# Patient Record
Sex: FEMALE | Race: WHITE | NOT HISPANIC OR LATINO | URBAN - METROPOLITAN AREA
[De-identification: names, ages, dates, MRNs, and addresses within clinical notes are randomized per-mention and may not be internally consistent; named-entity substitution may affect disease eponyms.]

---

## 2017-06-09 ENCOUNTER — EMERGENCY (EMERGENCY)
Facility: HOSPITAL | Age: 77
LOS: 1 days | Discharge: HOME | End: 2017-06-09
Attending: EMERGENCY MEDICINE | Admitting: EMERGENCY MEDICINE
Payer: MEDICARE

## 2017-06-09 VITALS
TEMPERATURE: 98 F | SYSTOLIC BLOOD PRESSURE: 175 MMHG | DIASTOLIC BLOOD PRESSURE: 104 MMHG | HEART RATE: 72 BPM | OXYGEN SATURATION: 98 % | RESPIRATION RATE: 16 BRPM

## 2017-06-09 DIAGNOSIS — G45.4 TRANSIENT GLOBAL AMNESIA: ICD-10-CM

## 2017-06-09 DIAGNOSIS — N17.9 ACUTE KIDNEY FAILURE, UNSPECIFIED: ICD-10-CM

## 2017-06-09 DIAGNOSIS — T78.40XA ALLERGY, UNSPECIFIED, INITIAL ENCOUNTER: ICD-10-CM

## 2017-06-09 DIAGNOSIS — Z79.899 OTHER LONG TERM (CURRENT) DRUG THERAPY: ICD-10-CM

## 2017-06-09 DIAGNOSIS — N18.3 CHRONIC KIDNEY DISEASE, STAGE 3 (MODERATE): ICD-10-CM

## 2017-06-09 DIAGNOSIS — Z96.659 PRESENCE OF UNSPECIFIED ARTIFICIAL KNEE JOINT: Chronic | ICD-10-CM

## 2017-06-09 DIAGNOSIS — Z90.710 ACQUIRED ABSENCE OF BOTH CERVIX AND UTERUS: Chronic | ICD-10-CM

## 2017-06-09 DIAGNOSIS — E03.9 HYPOTHYROIDISM, UNSPECIFIED: ICD-10-CM

## 2017-06-09 DIAGNOSIS — R41.82 ALTERED MENTAL STATUS, UNSPECIFIED: ICD-10-CM

## 2017-06-09 DIAGNOSIS — C50.911 MALIGNANT NEOPLASM OF UNSPECIFIED SITE OF RIGHT FEMALE BREAST: ICD-10-CM

## 2017-06-09 DIAGNOSIS — Z98.89 OTHER SPECIFIED POSTPROCEDURAL STATES: Chronic | ICD-10-CM

## 2017-06-09 LAB
ALBUMIN SERPL ELPH-MCNC: 4.5 G/DL — SIGNIFICANT CHANGE UP (ref 3.3–5)
ALP SERPL-CCNC: 55 U/L — SIGNIFICANT CHANGE UP (ref 40–120)
ALT FLD-CCNC: 22 U/L RC — SIGNIFICANT CHANGE UP (ref 10–45)
ANION GAP SERPL CALC-SCNC: 10 MMOL/L — SIGNIFICANT CHANGE UP (ref 5–17)
ANION GAP SERPL CALC-SCNC: 12 MMOL/L — SIGNIFICANT CHANGE UP (ref 5–17)
APPEARANCE UR: CLEAR — SIGNIFICANT CHANGE UP
AST SERPL-CCNC: 28 U/L — SIGNIFICANT CHANGE UP (ref 10–40)
BASOPHILS # BLD AUTO: 0 K/UL — SIGNIFICANT CHANGE UP (ref 0–0.2)
BASOPHILS NFR BLD AUTO: 0.5 % — SIGNIFICANT CHANGE UP (ref 0–2)
BILIRUB SERPL-MCNC: 0.6 MG/DL — SIGNIFICANT CHANGE UP (ref 0.2–1.2)
BILIRUB UR-MCNC: NEGATIVE — SIGNIFICANT CHANGE UP
BUN SERPL-MCNC: 31 MG/DL — HIGH (ref 7–23)
BUN SERPL-MCNC: 34 MG/DL — HIGH (ref 7–23)
CALCIUM SERPL-MCNC: 10.1 MG/DL — SIGNIFICANT CHANGE UP (ref 8.4–10.5)
CALCIUM SERPL-MCNC: 9.9 MG/DL — SIGNIFICANT CHANGE UP (ref 8.4–10.5)
CHLORIDE SERPL-SCNC: 102 MMOL/L — SIGNIFICANT CHANGE UP (ref 96–108)
CHLORIDE SERPL-SCNC: 98 MMOL/L — SIGNIFICANT CHANGE UP (ref 96–108)
CO2 SERPL-SCNC: 25 MMOL/L — SIGNIFICANT CHANGE UP (ref 22–31)
CO2 SERPL-SCNC: 29 MMOL/L — SIGNIFICANT CHANGE UP (ref 22–31)
COLOR SPEC: YELLOW — SIGNIFICANT CHANGE UP
CREAT SERPL-MCNC: 1.49 MG/DL — HIGH (ref 0.5–1.3)
CREAT SERPL-MCNC: 1.63 MG/DL — HIGH (ref 0.5–1.3)
DIFF PNL FLD: ABNORMAL
EOSINOPHIL # BLD AUTO: 0.2 K/UL — SIGNIFICANT CHANGE UP (ref 0–0.5)
EOSINOPHIL NFR BLD AUTO: 3.7 % — SIGNIFICANT CHANGE UP (ref 0–6)
EPI CELLS # UR: SIGNIFICANT CHANGE UP /HPF
GAS PNL BLDV: SIGNIFICANT CHANGE UP
GLUCOSE SERPL-MCNC: 105 MG/DL — HIGH (ref 70–99)
GLUCOSE SERPL-MCNC: 95 MG/DL — SIGNIFICANT CHANGE UP (ref 70–99)
GLUCOSE UR QL: NEGATIVE — SIGNIFICANT CHANGE UP
HCT VFR BLD CALC: 44.8 % — SIGNIFICANT CHANGE UP (ref 34.5–45)
HGB BLD-MCNC: 15.1 G/DL — SIGNIFICANT CHANGE UP (ref 11.5–15.5)
KETONES UR-MCNC: NEGATIVE — SIGNIFICANT CHANGE UP
LEUKOCYTE ESTERASE UR-ACNC: NEGATIVE — SIGNIFICANT CHANGE UP
LYMPHOCYTES # BLD AUTO: 1.2 K/UL — SIGNIFICANT CHANGE UP (ref 1–3.3)
LYMPHOCYTES # BLD AUTO: 19.6 % — SIGNIFICANT CHANGE UP (ref 13–44)
MAGNESIUM SERPL-MCNC: 2.2 MG/DL — SIGNIFICANT CHANGE UP (ref 1.6–2.6)
MCHC RBC-ENTMCNC: 32.8 PG — SIGNIFICANT CHANGE UP (ref 27–34)
MCHC RBC-ENTMCNC: 33.6 GM/DL — SIGNIFICANT CHANGE UP (ref 32–36)
MCV RBC AUTO: 97.5 FL — SIGNIFICANT CHANGE UP (ref 80–100)
MONOCYTES # BLD AUTO: 0.5 K/UL — SIGNIFICANT CHANGE UP (ref 0–0.9)
MONOCYTES NFR BLD AUTO: 7.6 % — SIGNIFICANT CHANGE UP (ref 2–14)
NEUTROPHILS # BLD AUTO: 4.2 K/UL — SIGNIFICANT CHANGE UP (ref 1.8–7.4)
NEUTROPHILS NFR BLD AUTO: 68.6 % — SIGNIFICANT CHANGE UP (ref 43–77)
NITRITE UR-MCNC: NEGATIVE — SIGNIFICANT CHANGE UP
PH UR: 5.5 — SIGNIFICANT CHANGE UP (ref 5–8)
PHOSPHATE SERPL-MCNC: 4 MG/DL — SIGNIFICANT CHANGE UP (ref 2.5–4.5)
PLATELET # BLD AUTO: 167 K/UL — SIGNIFICANT CHANGE UP (ref 150–400)
POTASSIUM SERPL-MCNC: 4.7 MMOL/L — SIGNIFICANT CHANGE UP (ref 3.5–5.3)
POTASSIUM SERPL-MCNC: 5.2 MMOL/L — SIGNIFICANT CHANGE UP (ref 3.5–5.3)
POTASSIUM SERPL-SCNC: 4.7 MMOL/L — SIGNIFICANT CHANGE UP (ref 3.5–5.3)
POTASSIUM SERPL-SCNC: 5.2 MMOL/L — SIGNIFICANT CHANGE UP (ref 3.5–5.3)
PROT SERPL-MCNC: 7.4 G/DL — SIGNIFICANT CHANGE UP (ref 6–8.3)
PROT UR-MCNC: NEGATIVE — SIGNIFICANT CHANGE UP
RBC # BLD: 4.59 M/UL — SIGNIFICANT CHANGE UP (ref 3.8–5.2)
RBC # FLD: 12.1 % — SIGNIFICANT CHANGE UP (ref 10.3–14.5)
RBC CASTS # UR COMP ASSIST: SIGNIFICANT CHANGE UP /HPF (ref 0–2)
SODIUM SERPL-SCNC: 135 MMOL/L — SIGNIFICANT CHANGE UP (ref 135–145)
SODIUM SERPL-SCNC: 141 MMOL/L — SIGNIFICANT CHANGE UP (ref 135–145)
SP GR SPEC: 1.02 — SIGNIFICANT CHANGE UP (ref 1.01–1.02)
TROPONIN T SERPL-MCNC: <0.01 NG/ML — SIGNIFICANT CHANGE UP (ref 0–0.06)
TSH SERPL-MCNC: 2.31 UIU/ML — SIGNIFICANT CHANGE UP (ref 0.27–4.2)
UROBILINOGEN FLD QL: NEGATIVE — SIGNIFICANT CHANGE UP
WBC # BLD: 6 K/UL — SIGNIFICANT CHANGE UP (ref 3.8–10.5)
WBC # FLD AUTO: 6 K/UL — SIGNIFICANT CHANGE UP (ref 3.8–10.5)
WBC UR QL: SIGNIFICANT CHANGE UP /HPF (ref 0–5)

## 2017-06-09 RX ORDER — ANASTROZOLE 1 MG/1
1 TABLET ORAL DAILY
Qty: 0 | Refills: 0 | Status: DISCONTINUED | OUTPATIENT
Start: 2017-06-09 | End: 2017-06-10

## 2017-06-09 RX ORDER — LEVOTHYROXINE SODIUM 125 MCG
75 TABLET ORAL DAILY
Qty: 0 | Refills: 0 | Status: DISCONTINUED | OUTPATIENT
Start: 2017-06-09 | End: 2017-06-10

## 2017-06-09 RX ORDER — SODIUM CHLORIDE 9 MG/ML
2000 INJECTION INTRAMUSCULAR; INTRAVENOUS; SUBCUTANEOUS ONCE
Qty: 0 | Refills: 0 | Status: COMPLETED | OUTPATIENT
Start: 2017-06-09 | End: 2017-06-09

## 2017-06-09 RX ORDER — SODIUM CHLORIDE 9 MG/ML
1000 INJECTION INTRAMUSCULAR; INTRAVENOUS; SUBCUTANEOUS
Qty: 0 | Refills: 0 | Status: DISCONTINUED | OUTPATIENT
Start: 2017-06-09 | End: 2017-06-10

## 2017-06-09 RX ADMIN — SODIUM CHLORIDE 1000 MILLILITER(S): 9 INJECTION INTRAMUSCULAR; INTRAVENOUS; SUBCUTANEOUS at 14:50

## 2017-06-09 RX ADMIN — Medication 1 DROP(S): at 21:24

## 2017-06-09 NOTE — CONSULT NOTE ADULT - ATTENDING COMMENTS
Diagnosis is probably transient global amnesia. Doubt small PCA infarct and highly doubt seizure.  Suggest. Outpatient neurology followup and MRI brain/MRA neck and head. Agree with aspirin for now if no contraindication, until stroke is ruled out. No role for further neurologic investigation.

## 2017-06-09 NOTE — H&P ADULT - NSHPPHYSICALEXAM_GEN_ALL_CORE
A pleasant elderly female in NAD, lying flat in bed, well groomed  HEENT: OG EOMI B/L slight erythema around eyes L > R  Neck: Supple, no JVD, no thyromegaly  Lungs: clear, no rhonchi, no wheeze, no crackles  CVS: S1 S2 no M/R/G  Abdomen: obese, no tenderness, no organomegaly, BS present  Neuro: AO x 3, no focal weakness, non focal  Psych: appropriate affect  Skin: warm, dry  Ext: no edema, no clubbing  Msk: no joint swelling or deformities  Back: no CVA tenderness, no kyphosis/scoliosis

## 2017-06-09 NOTE — H&P ADULT - PROBLEM SELECTOR PLAN 1
Resolved  Seen by neurology  Recommend MRI but t is adamantly opposed to having a closed MRI  Wants to have it as outpatient

## 2017-06-09 NOTE — H&P ADULT - NSHPREVIEWOFSYSTEMS_GEN_ALL_CORE
Gen: no loss of wt or appetite  ENT: no dizziness or hearing loss  Ophth: no blurring of vision or loss of vision  Resp: No cough or sputum production  CVS: No CP or palpitations or orthopnea  GI: no N/V/D  : no dysuria, hematuria  Endo: no polyuria or excessive sweating  Neuro: no weakness or paresthesias see above HPI as well  Psych: No suicidal or depressive ideation  Heme: No petechiae or easy bruising  Msk: No joint pain or swelling  All other ROS negative

## 2017-06-09 NOTE — H&P ADULT - FAMILY HISTORY
Mother  Still living? Unknown  Family history of diabetes mellitus type I, Age at diagnosis: Age Unknown     Sibling  Still living? Unknown  Family history of bipolar disorder, Age at diagnosis: Age Unknown

## 2017-06-09 NOTE — ED PROVIDER NOTE - MEDICAL DECISION MAKING DETAILS
[age] yo female presents with confusion; last seen normal before bed last night; + short term memory loss; no headache, no focal weakness, no numbness; no blurry vision; on exam oriented to person, place, not year; no focal neuro deficits; cranial nerves intact; no pronator drift; Plan: ct brain, labs, neuro

## 2017-06-09 NOTE — CONSULT NOTE ADULT - PROBLEM SELECTOR RECOMMENDATION 9
[]f/u with Neurology as outpt  []consider ASA until f/u if ok with PMD given GERD []f/u with Neurology as outpt  []consider ASA until f/u if ok with PMD given GERD  []if patient admitted for medical reasons can get MRI brain w/o con and routine EEG, however that should not hol up discharge

## 2017-06-09 NOTE — H&P ADULT - PROBLEM SELECTOR PLAN 2
Pt unaware of stage of kidney disease  educated at length,  at bedside who just spoke to pts PCP and reports that her creatinine baseline is 1.6  Reassured as well at length

## 2017-06-09 NOTE — H&P ADULT - HISTORY OF PRESENT ILLNESS
76yoF brought in by . Apparently, pt woke up confused this morning. She had +short term memory loss and was repeating phrases. Last night she was acting normal. Pt has no clear recollection but does report "feeling disoriented." Pt states she felt very anxious, no h/o illicit drug use, reports no headache, no visual changes, no neck pain, no cp/sob/palp/cough, no abd pain, dysuria. Currently she reports feeling back to her normal self and her  at her bedside confirms the same. No seizure activity noted. Reports allergic reaction to new make up around her eyes last two days

## 2017-06-09 NOTE — CONSULT NOTE ADULT - SUBJECTIVE AND OBJECTIVE BOX
Neurology consult    GUERO RAMOS76yFemale     Patient is a 76y old  Female who presents with a chief complaint of     HPI:  76 F  PMH hypothyroid, GERD, fibroids colloid cancer p/w transient episode of short term memory loss. Pt last recalls having sex this am. Post-coital  reported new onset memory issues Per  during episode patient repeating questions could not recall events from day prior. symptoms now resolved with patient not recalling event. Recent stressors including grandchild with colitis. denies abnormal movements, shaking, focal weakness, numbness, visual deficits     REVIEW OF SYSTEMS:    Constitutional: No fever, chills, fatigue, weakness  Eyes: no eye pain, visual disturbances, or discharge  ENT:  No difficulty hearing, tinnitus, vertigo; No sinus or throat pain  Neck: No pain or stiffness  Respiratory: No cough, dyspnea, wheezing   Cardiovascular: No chest pain, palpitations,   Gastrointestinal: No abdominal or epigastric pain. No nausea, vomiting  No diarrhea or constipation.   Genitourinary: No dysuria, frequency, hematuria or incontinence  Neurological: No headaches, lightheadedness, vertigo, numbness or tremors  Psychiatric: No depression, anxiety, mood swings or difficulty sleeping  Musculoskeletal: No joint pain or swelling; No muscle, back or extremity pain  Skin: No itching, burning, rashes or lesions   Lymph Nodes: No enlarged glands  Endocrine: No heat or cold intolerance; No hair loss, No h/o diabetes or thyroid dysfunction  Allergy and Immunologic: No hives or eczema    MEDICATIONS    synthroid, zantac, armidex    PMH: Hypothyroid, GERd, pre-DM, collodi cance       PSH: lumpectmy. hysterectomy      FAMILY HISTORY: Mother DM, siblings bipolar do. children cancer      SOCIAL HISTORY:  No history of tobacco or alcohol use     Allergies    No Known Allergies    Intolerances            Vital Signs Last 24 Hrs  T(C): 36.7, Max: 36.7 (-09 @ 09:12)  T(F): 98.1, Max: 98.1 (06- @ 09:12)  HR: 75 (72 - 75)  BP: 161/99 (161/99 - 175/104)  BP(mean): --  RR: 16 (16 - 16)  SpO2: 100% (98% - 100%)      On Neurological Examination:    Mental Status - Patient is alert, awake, oriented X3. fluent, names, no dysarthria no aphasia Follows commands well and able to answer questions appropriately. recall 3/3 Mood and affect  normal    Cranial Nerves - PERRL, EOMI, VFF, V1-V3 intact, no gross facial asymmetry, tongue/uvula midline    Motor Exam - 5/5 throughout, no drift     nml bulk/tone    Sensory    Intact to light touch and pinprick bilaterally    Coord: FTN intact bilaterally     Gait -  normal     Reflexes:       brisk 2+ throughout                                                  GENERAL Exam:     Nontoxic , No Acute Distress   	  HEENT:  normocephalic, atraumatic  		  LUNGS:	Clear bilaterally  No Wheeze  Decreased bilaterally  	  HEART:	 Normal S1S2   No murmur RRR        	  GI/ ABDOMEN:  Soft  Non tender    EXTREMITIES:   No Edema  No Clubbing  No Cyanosis No Edema    MUSCULOSKELETAL: Normal Range of Motion  	   SKIN:      Normal   No Ecchymosis               LABS:  CBC Full  -  ( 2017 09:41 )  WBC Count : 6.0 K/uL  Hemoglobin : 15.1 g/dL  Hematocrit : 44.8 %  Platelet Count - Automated : 167 K/uL  Mean Cell Volume : 97.5 fl  Mean Cell Hemoglobin : 32.8 pg  Mean Cell Hemoglobin Concentration : 33.6 gm/dL  Auto Neutrophil # : 4.2 K/uL  Auto Lymphocyte # : 1.2 K/uL  Auto Monocyte # : 0.5 K/uL  Auto Eosinophil # : 0.2 K/uL  Auto Basophil # : 0.0 K/uL  Auto Neutrophil % : 68.6 %  Auto Lymphocyte % : 19.6 %  Auto Monocyte % : 7.6 %  Auto Eosinophil % : 3.7 %  Auto Basophil % : 0.5 %    Urinalysis Basic - ( 2017 10:25 )    Color: Yellow / Appearance: Clear / S.020 / pH: x  Gluc: x / Ketone: Negative  / Bili: Negative / Urobili: Negative   Blood: x / Protein: Negative / Nitrite: Negative   Leuk Esterase: Negative / RBC: 0-2 /HPF / WBC 0-2 /HPF   Sq Epi: x / Non Sq Epi: Occasional /HPF / Bacteria: x          135  |  98  |  34<H>  ----------------------------<  95  5.2   |  25  |  1.49<H>    Ca    10.1      2017 09:41  Phos  4.0     -  Mg     2.2     -    TPro  7.4  /  Alb  4.5  /  TBili  0.6  /  DBili  x   /  AST  28  /  ALT  22  /  AlkPhos  55  06-09    LIVER FUNCTIONS - ( 2017 09:41 )  Alb: 4.5 g/dL / Pro: 7.4 g/dL / ALK PHOS: 55 U/L / ALT: 22 U/L RC / AST: 28 U/L / GGT: x               RADIOLOGY  CTH IMPRESSION: Age-appropriate involutional and ischemic gliotic changes. No   hemorrhage.

## 2017-06-09 NOTE — CONSULT NOTE ADULT - ASSESSMENT
76 F  PMH hypothyroid, GERD, fibroids colloid cancer p/w transient episode of memory loss post-coitally  since resolved. PE neurologically intact. CTH wnl. Most likely represents TGA since resolved. Further w/u as outpt with Neurology

## 2017-06-09 NOTE — ED ADULT NURSE NOTE - OBJECTIVE STATEMENT
76 y.o female c c/o AMS. Pts  noticed she didn't remember events from night before such as sleeping at [facility] for a benefit dinner. Pt is extremely repetitive in her statements. Keeps reiterating to her  at bedside "he should get another woman because he's such a wonderful man and shouldn't have to take care of a invalid"- this statement repeated 3x while examining pt. Pts pupils +4 R B/L. +strength in all 4 extremities. No weakness noted. No drift noted. Pt follows commands. Clear speech- disorientated thoughts. Alert to name/place/. Pt couldn't recall date this morning as per . No facial droop noted. redness below eyes noted B/L-  states she has had that before. FS 99. IV access obtained. Code stroke not called.

## 2017-06-09 NOTE — ED ADULT NURSE REASSESSMENT NOTE - NS ED NURSE REASSESS COMMENT FT1
Pts mental status back to baseline. Pt notified RN (Harjinder) that her IV in RAC needed to be moved to L arm d/t Hx of R sided lumpectomy. RN unaware of lumpectomy/ arm restriction upon initial encounter c pt during IV placement d/t pts AMS.  at bedside had forgotten hx of R arm restriction. IV d/c in RAC- clean/intact/ no redness noted. IV placed in LAC as per pts request. Pink band applied to R arm.

## 2017-06-10 VITALS
TEMPERATURE: 98 F | DIASTOLIC BLOOD PRESSURE: 68 MMHG | RESPIRATION RATE: 17 BRPM | HEART RATE: 58 BPM | SYSTOLIC BLOOD PRESSURE: 113 MMHG | OXYGEN SATURATION: 96 %

## 2017-06-10 LAB
ANION GAP SERPL CALC-SCNC: 14 MMOL/L — SIGNIFICANT CHANGE UP (ref 5–17)
BUN SERPL-MCNC: 27 MG/DL — HIGH (ref 7–23)
CALCIUM SERPL-MCNC: 9.6 MG/DL — SIGNIFICANT CHANGE UP (ref 8.4–10.5)
CHLORIDE SERPL-SCNC: 107 MMOL/L — SIGNIFICANT CHANGE UP (ref 96–108)
CO2 SERPL-SCNC: 21 MMOL/L — LOW (ref 22–31)
CREAT SERPL-MCNC: 1.36 MG/DL — HIGH (ref 0.5–1.3)
CULTURE RESULTS: NO GROWTH — SIGNIFICANT CHANGE UP
GLUCOSE SERPL-MCNC: 84 MG/DL — SIGNIFICANT CHANGE UP (ref 70–99)
HBA1C BLD-MCNC: 6 % — HIGH (ref 4–5.6)
HCT VFR BLD CALC: 39.7 % — SIGNIFICANT CHANGE UP (ref 34.5–45)
HGB BLD-MCNC: 13 G/DL — SIGNIFICANT CHANGE UP (ref 11.5–15.5)
MCHC RBC-ENTMCNC: 31.1 PG — SIGNIFICANT CHANGE UP (ref 27–34)
MCHC RBC-ENTMCNC: 32.7 GM/DL — SIGNIFICANT CHANGE UP (ref 32–36)
MCV RBC AUTO: 95 FL — SIGNIFICANT CHANGE UP (ref 80–100)
PLATELET # BLD AUTO: 145 K/UL — LOW (ref 150–400)
POTASSIUM SERPL-MCNC: 3.9 MMOL/L — SIGNIFICANT CHANGE UP (ref 3.5–5.3)
POTASSIUM SERPL-SCNC: 3.9 MMOL/L — SIGNIFICANT CHANGE UP (ref 3.5–5.3)
RBC # BLD: 4.18 M/UL — SIGNIFICANT CHANGE UP (ref 3.8–5.2)
RBC # FLD: 13.7 % — SIGNIFICANT CHANGE UP (ref 10.3–14.5)
SODIUM SERPL-SCNC: 142 MMOL/L — SIGNIFICANT CHANGE UP (ref 135–145)
SPECIMEN SOURCE: SIGNIFICANT CHANGE UP
WBC # BLD: 4.73 K/UL — SIGNIFICANT CHANGE UP (ref 3.8–10.5)
WBC # FLD AUTO: 4.73 K/UL — SIGNIFICANT CHANGE UP (ref 3.8–10.5)

## 2017-06-10 PROCEDURE — 83036 HEMOGLOBIN GLYCOSYLATED A1C: CPT

## 2017-06-10 PROCEDURE — 80048 BASIC METABOLIC PNL TOTAL CA: CPT

## 2017-06-10 PROCEDURE — 80053 COMPREHEN METABOLIC PANEL: CPT

## 2017-06-10 PROCEDURE — 99285 EMERGENCY DEPT VISIT HI MDM: CPT | Mod: 25

## 2017-06-10 PROCEDURE — 70450 CT HEAD/BRAIN W/O DYE: CPT

## 2017-06-10 PROCEDURE — 87086 URINE CULTURE/COLONY COUNT: CPT

## 2017-06-10 PROCEDURE — 85027 COMPLETE CBC AUTOMATED: CPT

## 2017-06-10 PROCEDURE — 82435 ASSAY OF BLOOD CHLORIDE: CPT

## 2017-06-10 PROCEDURE — 82962 GLUCOSE BLOOD TEST: CPT

## 2017-06-10 PROCEDURE — 83735 ASSAY OF MAGNESIUM: CPT

## 2017-06-10 PROCEDURE — 82553 CREATINE MB FRACTION: CPT

## 2017-06-10 PROCEDURE — 82947 ASSAY GLUCOSE BLOOD QUANT: CPT

## 2017-06-10 PROCEDURE — 93005 ELECTROCARDIOGRAM TRACING: CPT

## 2017-06-10 PROCEDURE — 71045 X-RAY EXAM CHEST 1 VIEW: CPT

## 2017-06-10 PROCEDURE — 84295 ASSAY OF SERUM SODIUM: CPT

## 2017-06-10 PROCEDURE — 82550 ASSAY OF CK (CPK): CPT

## 2017-06-10 PROCEDURE — 82330 ASSAY OF CALCIUM: CPT

## 2017-06-10 PROCEDURE — 81001 URINALYSIS AUTO W/SCOPE: CPT

## 2017-06-10 PROCEDURE — 84100 ASSAY OF PHOSPHORUS: CPT

## 2017-06-10 PROCEDURE — 84484 ASSAY OF TROPONIN QUANT: CPT

## 2017-06-10 PROCEDURE — 82803 BLOOD GASES ANY COMBINATION: CPT

## 2017-06-10 PROCEDURE — 84443 ASSAY THYROID STIM HORMONE: CPT

## 2017-06-10 PROCEDURE — 84132 ASSAY OF SERUM POTASSIUM: CPT

## 2017-06-10 PROCEDURE — 83605 ASSAY OF LACTIC ACID: CPT

## 2017-06-10 PROCEDURE — 85014 HEMATOCRIT: CPT

## 2017-06-10 RX ORDER — ACETAMINOPHEN 500 MG
650 TABLET ORAL ONCE
Qty: 0 | Refills: 0 | Status: COMPLETED | OUTPATIENT
Start: 2017-06-10 | End: 2017-06-10

## 2017-06-10 RX ORDER — FAMOTIDINE 10 MG/ML
20 INJECTION INTRAVENOUS ONCE
Qty: 0 | Refills: 0 | Status: COMPLETED | OUTPATIENT
Start: 2017-06-10 | End: 2017-06-10

## 2017-06-10 RX ADMIN — Medication 650 MILLIGRAM(S): at 05:35

## 2017-06-10 RX ADMIN — Medication 650 MILLIGRAM(S): at 05:05

## 2017-06-10 RX ADMIN — Medication 1 DROP(S): at 05:05

## 2017-06-10 RX ADMIN — Medication 1 DROP(S): at 13:02

## 2017-06-10 RX ADMIN — FAMOTIDINE 20 MILLIGRAM(S): 10 INJECTION INTRAVENOUS at 05:29

## 2017-06-10 RX ADMIN — Medication 75 MICROGRAM(S): at 05:05

## 2017-06-10 RX ADMIN — ANASTROZOLE 1 MILLIGRAM(S): 1 TABLET ORAL at 13:01

## 2017-06-10 NOTE — PROGRESS NOTE ADULT - SUBJECTIVE AND OBJECTIVE BOX
Patient is a 76y old  Female who presents with a chief complaint of Transient confusion (10 Familia 2017 12:50)      SUBJECTIVE / OVERNIGHT EVENTS: No nausea, vomiting or diarrhea, no fever or chills, no dizziness or chest pain, no dysuria or hematuria, no jt pain or swelling    MEDICATIONS  (STANDING):  sodium chloride 0.9%. 1000milliLiter(s) IV Continuous <Continuous>  anastrozole 1milliGRAM(s) Oral daily  artificial  tears Solution 1Drop(s) Both EYES three times a day  levothyroxine 75MICROGram(s) Oral daily    MEDICATIONS  (PRN):      Vital Signs Last 24 Hrs  T(C): 36.7, Max: 36.9 (-09 @ 19:35)  HR: 52 (52 - 60)  BP: 102/66 (102/66 - 123/82)  RR: 16 (14 - 17)  SpO2: 95% (95% - 97%)  Wt(kg): --  CAPILLARY BLOOD GLUCOSE    I&O's Summary    I & Os for current day (as of 10 Familia 2017 12:59)  =============================================  IN: 750 ml / OUT: 0 ml / NET: 750 ml      PHYSICAL EXAM:  GENERAL: NAD, well-developed  HEAD:  Atraumatic, Normocephalic  EYES: EOMI, PERRLA, conjunctiva and sclera clear  NECK: Supple, No JVD  CHEST/LUNG: Clear to auscultation bilaterally; No wheeze  HEART: Regular rate and rhythm; No murmurs, rubs, or gallops  ABDOMEN: Soft, Nontender, Nondistended; Bowel sounds present  EXTREMITIES:  2+ Peripheral Pulses, No clubbing, cyanosis, or edema  PSYCH: AAOx3  NEUROLOGY: non-focal  SKIN: No rashes or lesions    LABS:                        13.0   4.73  )-----------( 145      ( 10 Familia 2017 08:01 )             39.7     06-10    142  |  107  |  27<H>  ----------------------------<  84  3.9   |  21<L>  |  1.36<H>    Ca    9.6      10 Familia 2017 07:56  Phos  4.0     -  Mg     2.2     -    TPro  7.4  /  Alb  4.5  /  TBili  0.6  /  DBili  x   /  AST  28  /  ALT  22  /  AlkPhos  55  -      CARDIAC MARKERS ( 2017 16:20 )  x     / <0.01 ng/mL / x     / x     / x      CARDIAC MARKERS ( 2017 09:41 )  x     / <0.01 ng/mL / 87 U/L / x     / 1.8 ng/mL      Urinalysis Basic - ( 2017 10:25 )    Color: Yellow / Appearance: Clear / S.020 / pH: x  Gluc: x / Ketone: Negative  / Bili: Negative / Urobili: Negative   Blood: x / Protein: Negative / Nitrite: Negative   Leuk Esterase: Negative / RBC: 0-2 /HPF / WBC 0-2 /HPF   Sq Epi: x / Non Sq Epi: Occasional /HPF / Bacteria: x          Consultant(s) Notes Reviewed:      Care Discussed with Consultants/Other Providers: Dr Libman

## 2017-06-10 NOTE — DISCHARGE NOTE ADULT - PATIENT PORTAL LINK FT
“You can access the Follow[employer] Patient Portal, offered by [facility], by registering with the following website: [email or web address]”

## 2017-06-10 NOTE — PROGRESS NOTE ADULT - ASSESSMENT
Patient is a 76y old  Female who presents with a chief complaint of Transient confusion, now fully resolved

## 2017-06-10 NOTE — PROGRESS NOTE ADULT - PROBLEM SELECTOR PLAN 1
Seen and examined with neurology attending Dr Libman  normal exam  Cleared for dc  pt adamantly refusing inpt MRI brain  Wants outpatient open MRI  knows importance of having the MRI ASAP   at bedside aware as well

## 2017-06-10 NOTE — DISCHARGE NOTE ADULT - CARE PROVIDER_API CALL
Libman, Richard B (MD), Neurology; Vascular Neurology  [address] [place] [id number]  Phone: [phone]  Fax: [phone]

## 2017-06-10 NOTE — PROGRESS NOTE ADULT - ATTENDING COMMENTS
pt informed that this hospital visit will be registered as an Emergency Department visit as opposed to an actual admission

## 2017-06-10 NOTE — DISCHARGE NOTE ADULT - PLAN OF CARE
condition totally resolved follow up with neurology, need MRI brain in 1 - 2 weeks not allow kidney fxn to worsen regular diet continue synthroid regular follow up with oncology you do not make enough thyroid hormone  signs & symptoms of low levels of thyroid hormone - tired, getting cold easily, coarse or thin hair, constipation, shortness of breath, swelling, irregular periods  your doctor will do thyroid hormone blood tests at least once a year to monitor if medication dose is adequate  take your thyroid medicine as directed by your doctor & on empty stomach Avoid taking (NSAIDs) - (ex: Ibuprofen, Advil, Celebrex, Naprosyn)  Avoid taking any nephrotoxic agents (can harm kidneys) - Intravenous contrast for diagnostic testing, combination cold medications.  Have all medications adjusted for your renal function by your Health Care Provider.  Blood pressure control is important.  Take all medication as prescribed.

## 2017-06-10 NOTE — DISCHARGE NOTE ADULT - HOSPITAL COURSE
76yoF brought in by . Apparently, pt woke up confused this morning. She had +short term memory loss and was repeating phrases. Last night she was acting normal. Pt has no clear recollection but does report "feeling disoriented." Pt states she felt very anxious, no h/o illicit drug use, reports no headache, no visual changes, no neck pain, no cp/sob/palp/cough, no abd pain, dysuria. Currently she reports feeling back to her normal self and her  at her bedside confirms the same. No seizure activity noted. Reports allergic reaction to new make up around her eyes last two days    Seen and cleared by neurology for discharge  Recommend MRI brain asap after DC (pt refused MRI in patient states wants open MRI)  Understands importance of MRI eso in view of h/o breast Ca

## 2017-06-10 NOTE — DISCHARGE NOTE ADULT - MEDICATION SUMMARY - MEDICATIONS TO TAKE
I will START or STAY ON the medications listed below when I get home from the hospital:    anastrozole 1 mg oral tablet  -- 1 tab(s) by mouth once a day  -- Indication: For Breast cancer, right breast    Zantac 75 oral tablet  -- 1 tab(s) by mouth , As Needed  -- Indication: For GERD    TheraTears ophthalmic solution  -- 1 drop(s) in each eye 2 to 3 times a day  -- Indication: For dry eyes    Align 4 mg oral capsule  -- 1 cap(s) by mouth once a day  -- Indication: For Colon health    Synthroid 75 mcg (0.075 mg) oral tablet  -- 1 tab(s) by mouth 6 times a week (except Sunday)  -- Indication: For Hypothyroid

## 2017-06-10 NOTE — DISCHARGE NOTE ADULT - CARE PLAN
Principal Discharge DX:	Transient global amnesia  Goal:	condition totally resolved  Instructions for follow-up, activity and diet:	follow up with neurology, need MRI brain in 1 - 2 weeks  Secondary Diagnosis:	CKD (chronic kidney disease) stage 3, GFR 30-59 ml/min  Goal:	not allow kidney fxn to worsen  Instructions for follow-up, activity and diet:	regular diet  Secondary Diagnosis:	Hypothyroid  Goal:	continue synthroid  Secondary Diagnosis:	Breast cancer, right breast  Goal:	regular follow up with oncology Principal Discharge DX:	Transient global amnesia  Goal:	condition totally resolved  Instructions for follow-up, activity and diet:	follow up with neurology, need MRI brain in 1 - 2 weeks  Secondary Diagnosis:	CKD (chronic kidney disease) stage 3, GFR 30-59 ml/min  Goal:	not allow kidney fxn to worsen  Instructions for follow-up, activity and diet:	Avoid taking (NSAIDs) - (ex: Ibuprofen, Advil, Celebrex, Naprosyn)  Avoid taking any nephrotoxic agents (can harm kidneys) - Intravenous contrast for diagnostic testing, combination cold medications.  Have all medications adjusted for your renal function by your Health Care Provider.  Blood pressure control is important.  Take all medication as prescribed.  Secondary Diagnosis:	Hypothyroid  Goal:	continue synthroid  Instructions for follow-up, activity and diet:	you do not make enough thyroid hormone  signs & symptoms of low levels of thyroid hormone - tired, getting cold easily, coarse or thin hair, constipation, shortness of breath, swelling, irregular periods  your doctor will do thyroid hormone blood tests at least once a year to monitor if medication dose is adequate  take your thyroid medicine as directed by your doctor & on empty stomach  Secondary Diagnosis:	Breast cancer, right breast  Goal:	regular follow up with oncology

## 2017-11-10 RX ORDER — LEVOTHYROXINE SODIUM 125 MCG
0 TABLET ORAL
Qty: 0 | Refills: 0 | COMMUNITY

## 2017-11-10 RX ORDER — LEVOTHYROXINE SODIUM 125 MCG
1 TABLET ORAL
Qty: 0 | Refills: 0 | COMMUNITY

## 2017-11-10 RX ORDER — ANASTROZOLE 1 MG/1
0 TABLET ORAL
Qty: 0 | Refills: 0 | COMMUNITY

## 2017-11-10 RX ORDER — RANITIDINE HYDROCHLORIDE 150 MG/1
1 TABLET, FILM COATED ORAL
Qty: 0 | Refills: 0 | COMMUNITY

## 2017-11-10 RX ORDER — ANASTROZOLE 1 MG/1
1 TABLET ORAL
Qty: 0 | Refills: 0 | COMMUNITY

## 2017-11-10 RX ORDER — ALUMINUM ZIRCONIUM TRICHLOROHYDREX GLY 0.2 G/G
1 STICK TOPICAL
Qty: 0 | Refills: 0 | COMMUNITY

## 2020-07-30 NOTE — ED PROVIDER NOTE - OBJECTIVE STATEMENT
----- Message from Cat Carreno LPN sent at 7/30/2020 11:01 AM CDT -----  Called pt regarding results and recommendations per Dr. Cruz. Informed pt of results and recommendations. Pt reports taking Abilify 5 mg daily as prescribed. Pt states she has been feeling fell no concerns at this time. Pt denies taking any potassium supplements. Advised pt I will give this information to Dr. Cruz for review. Pt verbalized understanding with no further questions.      76yoF brought in by  who notes pt woke up confused this morning. Per  last night pt was acting normal but this am pt reports "feeling disoriented."  reports pt acting confused, +short term memory loss,  and repeating phrases. Pt endorsees +anxious. Pt reports no illicit drug use, reports no h/a, no visual changes, no neck pain, no f/c, no cp/sob/palp/cough, no abd pain, dysuria. pt confused but aox3.  no recent travel. 76yoF brought in by  who notes pt woke up confused this morning. Per  last night pt was acting normal (last known normal) but this am pt reports "feeling disoriented."  reports pt acting confused, +short term memory loss,  and repeating phrases. Pt endorsees +anxious. Pt reports no illicit drug use, reports no h/a, no visual changes, no neck pain, no f/c, no cp/sob/palp/cough, no abd pain, dysuria. pt confused but aox3.  no recent travel.

## 2021-05-31 NOTE — ED ADULT NURSE NOTE - HARM RISK FACTORS
Order received for Phase II Cardiac Rehab. Attempted to call patient to discuss preferred location, phone number not in service.   no

## 2021-12-09 PROBLEM — C50.911 MALIGNANT NEOPLASM OF UNSPECIFIED SITE OF RIGHT FEMALE BREAST: Chronic | Status: ACTIVE | Noted: 2017-06-09

## 2021-12-09 PROBLEM — E03.9 HYPOTHYROIDISM, UNSPECIFIED: Chronic | Status: ACTIVE | Noted: 2017-06-09

## 2022-01-04 ENCOUNTER — APPOINTMENT (OUTPATIENT)
Dept: UROLOGY | Facility: CLINIC | Age: 82
End: 2022-01-04
Payer: MEDICARE

## 2022-01-04 VITALS
SYSTOLIC BLOOD PRESSURE: 119 MMHG | TEMPERATURE: 97.8 F | DIASTOLIC BLOOD PRESSURE: 78 MMHG | HEIGHT: 63 IN | HEART RATE: 69 BPM | WEIGHT: 128 LBS | BODY MASS INDEX: 22.68 KG/M2

## 2022-01-04 DIAGNOSIS — N39.0 URINARY TRACT INFECTION, SITE NOT SPECIFIED: ICD-10-CM

## 2022-01-04 LAB
BILIRUB UR QL STRIP: NORMAL
CLARITY UR: CLEAR
COLLECTION METHOD: NORMAL
GLUCOSE UR-MCNC: NORMAL
HCG UR QL: 0.2 EU/DL
HGB UR QL STRIP.AUTO: NORMAL
KETONES UR-MCNC: NORMAL
LEUKOCYTE ESTERASE UR QL STRIP: NORMAL
NITRITE UR QL STRIP: NORMAL
PH UR STRIP: 5
PROT UR STRIP-MCNC: NORMAL
SP GR UR STRIP: 1.01

## 2022-01-04 PROCEDURE — 99204 OFFICE O/P NEW MOD 45 MIN: CPT

## 2022-01-04 PROCEDURE — 81003 URINALYSIS AUTO W/O SCOPE: CPT | Mod: QW

## 2022-01-04 RX ORDER — NITROFURANTOIN (MONOHYDRATE/MACROCRYSTALS) 25; 75 MG/1; MG/1
100 CAPSULE ORAL
Qty: 10 | Refills: 6 | Status: ACTIVE | COMMUNITY
Start: 2022-01-04 | End: 1900-01-01

## 2022-02-04 ENCOUNTER — NON-APPOINTMENT (OUTPATIENT)
Age: 82
End: 2022-02-04

## 2022-02-13 ENCOUNTER — NON-APPOINTMENT (OUTPATIENT)
Age: 82
End: 2022-02-13

## 2022-03-01 NOTE — ASSESSMENT
[FreeTextEntry1] : \par =======================================================================================\par ASSESSMENT and PLAN\par \par The patient is a 81 year female with a history of the following:\par \par 1. Recurrent UTI's\par Greater than 50% of this 45 minute visit was spent discussing the causes, diagnosis, prevention and treatment of sporadic and recurrent UTI's in women. She understands that they often come from the patient's own vagina (especially when UTI's are post-coital) or own rectum (especially if the patient is fecally incontinent, constipated or has anal intercourse). \par \par The patient is post menopausal.\par The patient's UTI's are related to intercourse.\par \par The patient and I discussed standard hygiene techniques for prevention. These include wiping front to back after having bowel movements. Voiding after intercourse remains controversial in its ability to help, but I recommend it. Patients who are constipated are at increased risk of UTI's.  We do not recommend the use of douches. Swimming, spinning (bicycling), intercourse, staying in wet bathing suits or wet pads can also trigger UTI's in certain patients. In premenopausal women, UTI's may be more common during certain times of the menstrual cycle. In addition the use of certain forms of birth control (e.g. condoms,  OCP, diaphragms, IUDs) or intravaginal appliances (e.g. pessaries) may also cause UTI's though this varies GREATLY from patient to patient.\par \par In 2019, the AUA issued their first guidelines for the management of recurrent UTI's in women however there is still a paucity of options.\par The patient understands that urine cultures are critical for the diagnosis and tracking of UTI's, especially with evolving and increasing antibiotic resistance. We do NOT treat asymptomatic bacteriuria and we do NOT test for cure. If she has symptoms of a UTI, she should contact our office for a urine culture order, otherwise, I recommend on weekends seeing an Urgent Care Center and requesting that a culture be sent to me. \par \par I try to avoid using antibiotic suppression as this causes resistance and can cause other adverse reactions. We also discussed the damage that antibiotics do to both the bowel and vaginal dotty, and how this damage can cause diarrhea and vaginal yeast infections. This in turn can cause a cycle of UTI's.\par \par Unfortunately in many ways, we have not made many advances in the treatment of recurrent UTI's in women and our treatment/prevention options are limited.\par \par Today I have recommended the following:\par \par Diagnosis\par Because she has had pediatric UTI's / kidney issues, I would like a RBUS\par \par Prevention:\par -- Vaginal health probiotic orally: these can be found easily at pharmacy. I do not recommend for UTI's in particular is Florastor (Saccharomyces boulardii)\par -- Intravaginal vaginal probiotic. I recommended using a product like Florafemme once a week at bedtime or twice a week if on antibiotics\par --  I recommended that the patient stay well hydrated. Increased water intake in some studies can help prevent UTI's.\par --  Nitrofurantoin 100mg po QD PRN intercourse. The patient understands that this antibiotic can cause crystalluria and GI upset. This patient has no history of CRI. The patient will call if she develops AE's to this antibiotic.\par \par \par 2. Given her family history of cancer I offered her germline screening\par She accepted this. Her son has a h/o sarcoma and her daughter breast CA: I therefore recommended [employer] Multicancer panel.\par \par -----------------------------------------------------------------------------------------------------\par LABS/TESTS Ordered: RBUS\par Meds Ordered: Florafemme & Probiotics and honeymoon Nitrofurantoin \par Follow up: 6mos\par -----------------------------------------------------------------------------------------------------\par \par Greater than 50% of this 45 minute visit was spent counseling the patient and coordinating care.\par \par Thank you for allowing me to assist in the care of your patient. Should you have any questions please do not hesitate to reach out to me.\par \par \par Alise Godinez MD\[facility] Associate \[facility] Department of Urology\[facility] Phone: [phone]\[facility] Fax: [phone]\[facility] \[address] [address]\[facility]

## 2022-03-01 NOTE — ADDENDUM
[FreeTextEntry1] : [facility] testing for genetic abnormalities was normal\par 1/4/2022: [facility] Multi cancer panel negative\par \par Patient informed and was happy with results.\par Will fax to Dr. Chavez and Giovanna per pt's request\par \par 22 pages records reviewed:\par She was put on Bactrim on 12/6/2021. She was put on Macrobid on 2/27/2021. She was given 8 weeks suppressive Nitrofurantoin 50mg po QD on 7/16/2020.\par \par 6/23/2020: UA pos NIT, ++LE, Neg GLC, BLD, Ketones, PRT, 0 RBC, >60 WBC; UCx E. coli S: Cipro, Fosfo, LVQ, Nit, Pivmecillinam, Bactrim\par 7/16/2020: UA negative\par \par 2/15/2022: Sonogram done\par Right kidney and left kindye have no masses.  Few tiny stones in RUP between 2-3mm.\par BL renal cyst up to 1.2cm on right and 2.6cm on left. No collections\par Small splenic hematoma. Bladder normal No sig PVR. BL jets seen.\par 2/15/2022: Bone density: \par Osteoporosis based on Left femoral neck T score. The BMD for the left femoral neck decreased by -6.7% since the last DXA scan\par \par \par Pt went to [facility] on 2/12/2022 with c/o UTI symptoms. Was given Nitrofurantoin.\par WAiting for culture\par \par I encouraged her NOT to use Nit for treatment since it may make her resistant. She understood.\par Will have her do LLSA and come see me after. May consider Methenamine.\par \par 2/18/2022: Received Urine culture from [facility]\par 2/14/2022: UCx 50-100K E. coli: R: Amp, Unasyn, Ancef, CTX, Cipro, Getn, LVQ, Tobra Bactrim\par I: Augmentin\par S: Erta, Cefepime, Imi, Nit, Pip/Tazo\par \par \par 2/21-22/2022: LLSA\par POOR: Volumes 0.97/0.70 and Citrates 143, pH 5.3 leading to poor SS UA\par GOOD: Oxalates, Calcium, Elytes

## 2022-03-01 NOTE — HISTORY OF PRESENT ILLNESS
[FreeTextEntry1] : Language: English\par Date of First visit: 2021\par Accompanied by: Self\par Contact info: [phone]\par Referring Provider/PCP: Lucrecia Chavez\par fax: [phone]\par \par \par \par CC/ Problem List:\par UTIs\par \par \par ===============================================================================\par FIRST VISIT:\par The patient is a 81 year female who first presents 2022 for recurrent UTI's. She was in a "kidney hospital'\[facility]  as a child and she had urinary frequency. She never got toilet trained after age 4yo. She also had nocturnal enuresis. \par \par She thinks her first UTI was around age 22yo. her UTI's were related to intercourse. She also feels like now, she gets UTI's with intercourse. She denies constipation. She takes showers not baths. She does not swim, ride a bike or spin. She uses a lubricant for intercourse. She was on hormones but was taken off of them when she got breast cancer. She was not on long term treatment for her breast cancer. She denies yeast infections. She does have vaginal dryness. She is on Estrace cream for her vaginal dryness. She feels it works and can use it with her breast cancer. She does drink a lot of water.\par \par \par -------------------------------------------------------------------------------------------\par INTERVAL VISITS:\par \par \par ===============================================================================\par \par PMH: UTis, Breast cancer, Hip replacement, Vaginal issues, Diverticulitis\par PSH: Hip replacement, Hysterectomy\par POBH: (if applicable)\par FH: Daughter had breast cancer, Son  of sarcoma\par First  (father of kids) has prostate cancer\par ALL:NKDA\par MEDS: Synthroid, Buckthorn oil, Vit D3, Align\par SOC: Denies Tob, EtOH, drugs\par \par \par ROS: Review of Systems is as per HPI unless otherwise denoted below\par \par \par ===============================================================================\par DATA: \par \par LABS:-------------------------------------------------------------------------------------------------------------------\par 2021: Ucx >100K\par R: Amp\par 2021: UCx Klebsiella\par R: Amp\par \par 2022: Negative Udip\par \par \par RADS:-------------------------------------------------------------------------------------------------------------------\par \par \par \par PATHOLOGY/CYTOLOGY:-------------------------------------------------------------------------------------------\par \par \par \par VOIDING STUDIES: ----------------------------------------------------------------------------------------------------\par 2021: PVR 30cc\par \par \par STONE STUDIES: (Analysis/LLSA)----------------------------------------------------------------------------------\par \par \par \par PROCEDURES: -----------------------------------------------------------------------------------------------\par \par \par \par \par ===============================================================================\par \par PHYSICAL EXAM:\par \par GEN: AAOx3, NAD, Habitus: normal\par \par BARRIERS to CARE: none\par \par PSYCH: Appropriate Behavior, Affect Congruent\par \par HEENT: AT/NC Trachea midline. EOMI.\par \par Lungs: No labored breathing.\par \par NEURO: + Movement, all 4 extremities grossly intact without deficits. No tremors.\par \par SKIN: Warm dry. No visible rashes or ulcers\par \par GAIT: Gait normal, Stability good\par \par =======================================================================================\par

## 2022-03-03 ENCOUNTER — APPOINTMENT (OUTPATIENT)
Dept: UROLOGY | Facility: CLINIC | Age: 82
End: 2022-03-03
Payer: MEDICARE

## 2022-03-03 VITALS
DIASTOLIC BLOOD PRESSURE: 75 MMHG | OXYGEN SATURATION: 97 % | TEMPERATURE: 97.1 F | HEART RATE: 75 BPM | SYSTOLIC BLOOD PRESSURE: 117 MMHG

## 2022-03-03 PROCEDURE — 99214 OFFICE O/P EST MOD 30 MIN: CPT

## 2022-03-03 NOTE — ASSESSMENT
[FreeTextEntry1] : \par =======================================================================================\par ASSESSMENT and PLAN\par \par The patient is a 81 year female with a history of the following:\par \par 1. Recurrent UTI's\par -- Continue Florafemme once a week\par -- Continue Nitrofurantoin PRN intercourse\par \par We will hold off on Methenamine because she just started Florafemme.\par I told her I will not give her PRN abx because of how resistant she is. If she is sick in [place] she needs to get a CULTURE. I explained at length how important this is. I put her urine culture results in her phone.\par \par 2. Given her family history of cancer I offered her germline screening\par She accepted this. Her son has a h/o sarcoma and her daughter breast CA. Her panel was negative and results were released.\par \par 3. Renal stones:\par The patient and I reviewed her [facility]/metabolic stone results.\par Her results showed low volumes and low citrate.\par \par Based on the patient results I recommended the following:\par \par a. I recommended drinking more water to void a volume of 2.5L. This generally requires voiding Q1-2H during the day and the patient's urine should be mostly clear. Sparkling water can add acidity which is less beneficial but I would prefer that over not drinking water at all. Adding lemon juice to water also helps prevent stones by increasing citrate.\par \par h Increasing dietary citrate. This can be done by adding lemon juice to water or by taking a citrate supplement like K-citrate.\par \par -----------------------------------------------------------------------------------------------------\par LABS/TESTS Ordered: \par Meds Ordered: Florafemme & Probiotics and honeymoon Nitrofurantoin \par Follow up: 3 mos\par -----------------------------------------------------------------------------------------------------\par \par Greater than 50% of this 35 minute visit was spent counseling the patient and coordinating care.\par \par Thank you for allowing me to assist in the care of your patient. Should you have any questions please do not hesitate to reach out to me.\par \par \par Alise Godinez MD\par Associate \[facility] Department of Urology\par [facility] Phone: [phone]\par Fax: [phone]\[facility] \par [address] [address]\[facility]

## 2022-03-03 NOTE — HISTORY OF PRESENT ILLNESS
[FreeTextEntry1] : Language: English\par Date of First visit: 2021\par Accompanied by: Self\par Contact info: [phone]\par \par Referring Provider/PCP: Lucrecia Chavez\par fax: [phone]\par \par \par \par CC/ Problem List:\par UTIs\par \par \par ===============================================================================\par FIRST VISIT:\par The patient was a 81 year female who first presented on 2022 for recurrent UTI's. She was in a "kidney hospital' as a child and she had urinary frequency. She never got toilet trained after age 4yo. She also had nocturnal enuresis. \par \par She thinks her first UTI was around age 24yo. her UTI's were related to intercourse. She also feels like now, she gets UTI's with intercourse. She denies constipation. She takes showers not baths. She does not swim, ride a bike or spin. She uses a lubricant for intercourse. She was on hormones but was taken off of them when she got breast cancer. She was not on long term treatment for her breast cancer. She denies yeast infections. She does have vaginal dryness. She is on Estrace cream for her vaginal dryness. She feels it works and can use it with her breast cancer. She does drink a lot of water.\par \par \par -------------------------------------------------------------------------------------------\par INTERVAL VISITS:\par \par 22 pages records reviewed:\par She was put on Bactrim on 2021. She was put on Macrobid on 2021. She was given 8 weeks suppressive Nitrofurantoin 50mg po QD on 2020. [facility] with c/o UTI symptoms. Was given Nitrofurantoin. \par \par The patient's age today 2022 is 81 year old.\par Please note interval events and changes in PMH, PSH, MEDS and ALLERGIES were reviewed.\par \par Her culture was highly resistant. She still thinks she drinks a lot of water.\par She just started the Florafemme last week.\par She gets most of her UTI's with intercourse. She uses vaginal estradiol since 2022.\par \par ===============================================================================\par \par PMH: UTIs, Breast cancer, Hip replacement, Vaginal issues, Diverticulitis\par PSH: Hip replacement, Hysterectomy\par POBH: (if applicable)\par FH: Daughter had breast cancer, Son  of sarcoma\par First  (father of kids) has prostate cancer\par ALL:NKDA\par MEDS: Synthroid, Buckthorn oil, Vit D3, Align, Vaginal estradiol\par SOC: Denies Tob, EtOH, drugs\par \par \par ROS: Review of Systems is as per HPI unless otherwise denoted below\par \par \par ===============================================================================\par DATA: \par \par LABS:-------------------------------------------------------------------------------------------------------------------\par 2021: Ucx >100K\par R: Amp\par 2021: UCx Klebsiella\par R: Amp\par \par \par [facility] testing for genetic abnormalities was normal\par 2022: [facility] Multi cancer panel negative\par \par 2022: Negative Udip\par \par 2020: UA pos NIT, ++LE, Neg GLC, BLD, Ketones, PRT, 0 RBC, >60 WBC; UCx E. coli S: Cipro, Fosfo, LVQ, Nit, Pivmecillinam, Bactrim\par 2020: UA negative\par \par 2022: Received Urine culture from [facility]\par 2022: UCx 50-100K E. coli: R: Amp, Unasyn, Ancef, CTX, Cipro, Gent, LVQ, Tobra Bactrim\par I: Augmentin\par S: Erta, Cefepime, Imi, Nit, Pip/Tazo\par \par \par \par RADS:-------------------------------------------------------------------------------------------------------------------\par \par 2/15/2022: Sonogram done\par Right kidney and left kindye have no masses.  Few tiny stones in RUP between 2-3mm.\par BL renal cyst up to 1.2cm on right and 2.6cm on left. No collections\par Small splenic hematoma. Bladder normal No sig PVR. BL jets seen.\par 2/15/2022: Bone density: \par Osteoporosis based on Left femoral neck T score. The BMD for the left femoral neck decreased by -6.7% since the last DXA scan\par \par \par PATHOLOGY/CYTOLOGY:-------------------------------------------------------------------------------------------\par \par \par \par VOIDING STUDIES: ----------------------------------------------------------------------------------------------------\par 2021: PVR 30cc\par \par \par STONE STUDIES: (Analysis/LLSA)----------------------------------------------------------------------------------\par -: LLSA\par POOR: Volumes 0.97/0.70 and Citrates 143, pH 5.3 leading to poor SS UA\par GOOD: Oxalates, Calcium, Elytes\par \par \par PROCEDURES: -----------------------------------------------------------------------------------------------\par \par \par \par \par ===============================================================================\par \par PHYSICAL EXAM:\par \par GEN: AAOx3, NAD, Habitus: normal\par \par BARRIERS to CARE: none\par \par PSYCH: Appropriate Behavior, Affect Congruent\par \par HEENT: AT/NC Trachea midline. EOMI.\par \par Lungs: No labored breathing.\par \par NEURO: + Movement, all 4 extremities grossly intact without deficits. No tremors.\par \par SKIN: Warm dry. No visible rashes or ulcers\par \par GAIT: Gait normal, Stability good\par \par =======================================================================================\par

## 2022-06-07 ENCOUNTER — APPOINTMENT (OUTPATIENT)
Dept: UROLOGY | Facility: CLINIC | Age: 82
End: 2022-06-07
Payer: MEDICARE

## 2022-06-07 VITALS
OXYGEN SATURATION: 97 % | HEART RATE: 84 BPM | TEMPERATURE: 97 F | SYSTOLIC BLOOD PRESSURE: 117 MMHG | DIASTOLIC BLOOD PRESSURE: 83 MMHG

## 2022-06-07 DIAGNOSIS — N20.0 CALCULUS OF KIDNEY: ICD-10-CM

## 2022-06-07 LAB
BILIRUB UR QL STRIP: NEGATIVE
COLLECTION METHOD: NORMAL
GLUCOSE UR-MCNC: NEGATIVE
HCG UR QL: 0.2 EU/DL
HGB UR QL STRIP.AUTO: NORMAL
KETONES UR-MCNC: NEGATIVE
LEUKOCYTE ESTERASE UR QL STRIP: NEGATIVE
NITRITE UR QL STRIP: NEGATIVE
PH UR STRIP: 5.5
PROT UR STRIP-MCNC: NEGATIVE
SP GR UR STRIP: 1.01

## 2022-06-07 PROCEDURE — 99213 OFFICE O/P EST LOW 20 MIN: CPT

## 2022-06-07 PROCEDURE — 81003 URINALYSIS AUTO W/O SCOPE: CPT | Mod: QW

## 2022-08-03 ENCOUNTER — NON-APPOINTMENT (OUTPATIENT)
Age: 82
End: 2022-08-03

## 2023-02-23 ENCOUNTER — APPOINTMENT (OUTPATIENT)
Dept: UROLOGY | Facility: CLINIC | Age: 83
End: 2023-02-23

## 2023-04-13 NOTE — HISTORY OF PRESENT ILLNESS
[FreeTextEntry1] : Language: English\par Date of First visit: 2021\par Accompanied by: Self\par Contact info: [phone]\par \par Referring Provider/PCP: Lucrecia Chavez\par fax: [phone]\par \par \par \par CC/ Problem List:\par UTIs\par \par \par ===============================================================================\par FIRST VISIT:\par The patient was a 81 year female who first presented on 2022 for recurrent UTI's. She was in a "kidney hospital' as a child and she had urinary frequency. She never got toilet trained after age 4yo. She also had nocturnal enuresis. \par \par She thinks her first UTI was around age 22yo. her UTI's were related to intercourse. She also feels like now, she gets UTI's with intercourse. She denies constipation. She takes showers not baths. She does not swim, ride a bike or spin. She uses a lubricant for intercourse. She was on hormones but was taken off of them when she got breast cancer. She was not on long term treatment for her breast cancer. She denies yeast infections. She does have vaginal dryness. She is on Estrace cream for her vaginal dryness. She feels it works and can use it with her breast cancer. She does drink a lot of water.\par \par \par -------------------------------------------------------------------------------------------\par INTERVAL VISITS:\par \par 22 pages records reviewed:\par She was put on Bactrim on 2021. She was put on Macrobid on 2021. She was given 8 weeks suppressive Nitrofurantoin 50mg po QD on 2020. Pt went [facility] on [facility] with c/o UTI symptoms. Was given Nitrofurantoin. \par \par \par She gets most of her UTI's with intercourse. She uses vaginal estradiol since 2022.\par She is also using cranberry, Florafemme and D-mannose. She is also abstatining from intercourse. She has not had a UTI since her last visit. She is drinking a lot of water. She is not using lemon juice.\par \par ===============================================================================\par \par PMH: UTIs, Breast cancer, Hip replacement, Vaginal issues, Diverticulitis\par PSH: Hip replacement, Hysterectomy\par POBH: (if applicable)\par FH: Daughter had breast cancer, Son  of sarcoma\par First  (father of kids) has prostate cancer\par ALL:NKDA\par MEDS: Synthroid, Buckthorn oil, Vit D3, Align, Vaginal estradiol, D-mannose, cranberry, Florafemme\par SOC: Denies Tob, EtOH, drugs\par \par \par ROS: Review of Systems is as per HPI unless otherwise denoted below\par \par \par ===============================================================================\par DATA: \par \par LABS:-------------------------------------------------------------------------------------------------------------------\par 2021: Ucx >100K\par R: Amp\par 2021: UCx Klebsiella\par R: Amp\par \par \par [facility] testing for genetic abnormalities was normal\par 2022: [facility] Multi cancer panel negative\par \par 2022: Negative Udip\par \par 2020: UA pos NIT, ++LE, Neg GLC, BLD, Ketones, PRT, 0 RBC, >60 WBC; UCx E. coli S: Cipro, Fosfo, LVQ, Nit, Pivmecillinam, Bactrim\par 2020: UA negative\par \par 2022: Received Urine culture from [facility]\par 2022: UCx 50-100K E. coli: R: Amp, Unasyn, Ancef, CTX, Cipro, Gent, LVQ, Tobra Bactrim\par I: Augmentin\par S: Erta, Cefepime, Imi, Nit, Pip/Tazo\par \par 2022: UA trace, Neg NIT/LE, Glucose Neg, PRT\par \par \par RADS:-------------------------------------------------------------------------------------------------------------------\par \par 2/15/2022: Sonogram done\par Right kidney and left kidney have no masses.  Few tiny stones in RUP between 2-3mm.\par BL renal cyst up to 1.2cm on right and 2.6cm on left. No collections\par Small splenic hematoma. Bladder normal No sig PVR. BL jets seen.\par \par 2/15/2022: Bone density: \par Osteoporosis based on Left femoral neck T score. The BMD for the left femoral neck decreased by -6.7% since the last DXA scan\par \par \par PATHOLOGY/CYTOLOGY:-------------------------------------------------------------------------------------------\par \par \par \par VOIDING STUDIES: ----------------------------------------------------------------------------------------------------\par 2021: PVR 30cc\par \par \par STONE STUDIES: (Analysis/LLSA)----------------------------------------------------------------------------------\par -: LLSA\par POOR: Volumes 0.97/0.70 and Citrates 143, pH 5.3 leading to poor SS UA\par GOOD: Oxalates, Calcium, Elytes\par \par \par PROCEDURES: -----------------------------------------------------------------------------------------------\par \par \par \par \par ===============================================================================\par \par PHYSICAL EXAM:\par \par GEN: AAOx3, NAD, Habitus: normal\par \par BARRIERS to CARE: none\par \par PSYCH: Appropriate Behavior, Affect Congruent\par \par HEENT: AT/NC Trachea midline. EOMI.\par \par Lungs: No labored breathing.\par \par NEURO: + Movement, all 4 extremities grossly intact without deficits. No tremors.\par \par SKIN: Warm dry. No visible rashes or ulcers\par \par GAIT: Gait normal, Stability good\par \par =======================================================================================\par ASSESSMENT and PLAN\par \par The patient is a 81 year female with a history of the following:\par \par \par 1. Recurrent UTI's\par She is abstaining and using D-mannose, cranberry, Florafemme and vaginal estrogen. She is doing well on this.\par \par 2. Given her family history of cancer I offered her germline screening\par She accepted this. Her son has a h/o sarcoma and her daughter breast CA. Her panel was negative and results were released.\par \par 3. Renal stones:\par We will repeat a sonogram in 2023.\par \par -----------------------------------------------------------------------------------------------------\par LABS/TESTS Ordered: renal sono 2023\par Meds Ordered: Florafemme & Probiotics and honeymoon Nitrofurantoin \par Follow up: After the sono\par -----------------------------------------------------------------------------------------------------\par \par Greater than 50% of this 20 minute visit was spent counseling the patient and coordinating care.\par \par Thank you for allowing me to assist in the care of your patient. Should you have any questions please do not hesitate to reach out to me.\par \par \par Alise Godinez MD\par Associate \[facility] Department of Urology\par [facility] Phone: [phone]\[facility] Fax: [phone]\[facility] \par [address] [place] [id number]\[facility]

## 2023-04-13 NOTE — ADDENDUM
[FreeTextEntry1] : 4/11/2023: RBUS \par bilateral renal cysts; 5mm RUP echogenic area stone vs artifact\par Bladder normal with BL jets. PVR 31cc

## 2023-04-24 ENCOUNTER — APPOINTMENT (OUTPATIENT)
Dept: UROLOGY | Facility: CLINIC | Age: 83
End: 2023-04-24
Payer: MEDICARE

## 2023-04-24 VITALS
OXYGEN SATURATION: 98 % | HEART RATE: 66 BPM | DIASTOLIC BLOOD PRESSURE: 73 MMHG | SYSTOLIC BLOOD PRESSURE: 110 MMHG | TEMPERATURE: 97.2 F

## 2023-04-24 DIAGNOSIS — R31.29 OTHER MICROSCOPIC HEMATURIA: ICD-10-CM

## 2023-04-24 LAB
BILIRUB UR QL STRIP: NORMAL
CLARITY UR: CLEAR
COLLECTION METHOD: NORMAL
GLUCOSE UR-MCNC: NORMAL
HCG UR QL: 0.2 EU/DL
HGB UR QL STRIP.AUTO: NORMAL
KETONES UR-MCNC: NORMAL
LEUKOCYTE ESTERASE UR QL STRIP: NORMAL
NITRITE UR QL STRIP: NORMAL
PH UR STRIP: 5.5
PROT UR STRIP-MCNC: NORMAL
SP GR UR STRIP: 1.02

## 2023-04-24 PROCEDURE — 99213 OFFICE O/P EST LOW 20 MIN: CPT

## 2023-04-25 LAB
APPEARANCE: CLEAR
BACTERIA: NEGATIVE /HPF
BILIRUBIN URINE: NEGATIVE
BLOOD URINE: NEGATIVE
CAST: 0 /LPF
COLOR: YELLOW
EPITHELIAL CELLS: 8 /HPF
GLUCOSE QUALITATIVE U: NEGATIVE MG/DL
KETONES URINE: NEGATIVE MG/DL
LEUKOCYTE ESTERASE URINE: ABNORMAL
MICROSCOPIC-UA: NORMAL
NITRITE URINE: NEGATIVE
PH URINE: 5.5
PROTEIN URINE: NEGATIVE MG/DL
RED BLOOD CELLS URINE: 0 /HPF
SPECIFIC GRAVITY URINE: 1.02
UROBILINOGEN URINE: 0.2 MG/DL
WHITE BLOOD CELLS URINE: 7 /HPF

## 2024-03-26 NOTE — ADDENDUM
[FreeTextEntry1] : 4/24/2023: UA micro 7 WBC, 0 RBC, 8 Epi, Mod LE (likely contaminated)  3/25/2024: Bladder sono normal with PVR 12cc, BL jets seen, no abnormalities renal sono: Kidneys normal with RUP 2.2cm cyst and 1.2cm RUP cyst and 1.9cm RMP cyst Small hyperechoic lesion in RUP which could be a stone; stable from prior No hydro Left kidney with 3cm partially exophytic mid pole cyst 1.9cm LUP cyst; no Ca++, no hydro

## 2024-03-26 NOTE — HISTORY OF PRESENT ILLNESS
[FreeTextEntry1] : Language: English Date of First visit: 2021 Accompanied by: Self Contact info: [phone]  Referring Provider/PCP: Lucrecia Chavez fax: [phone]    CC/ Problem List: UTIs   =============================================================================== FIRST VISIT: The patient was a 81 year female who first presented on 2022 for recurrent UTI's. She was in a "kidney hospital' as a child and she had urinary frequency. She never got toilet trained after age 6yo. She also had nocturnal enuresis.   She thinks her first UTI was around age 22yo. her UTI's were related to intercourse. She also feels like now, she gets UTI's with intercourse. She denies constipation. She takes showers not baths. She does not swim, ride a bike or spin. She uses a lubricant for intercourse. She was on hormones but was taken off of them when she got breast cancer. She was not on long term treatment for her breast cancer. She denies yeast infections. She does have vaginal dryness. She is on Estrace cream for her vaginal dryness. She feels it works and can use it with her breast cancer. She does drink a lot of water.   ------------------------------------------------------------------------------------------- INTERVAL VISITS:  22 pages records reviewed: She was put on Bactrim on 2021. She was put on Macrobid on 2021. She was given 8 weeks suppressive Nitrofurantoin 50mg po QD on 2020. Pt went to [facility] on 2022 with c/o UTI symptoms. She was given Nitrofurantoin.  She gets most of her UTI's with intercourse. She uses vaginal estradiol since 2022. She is also using cranberry, Florafemme and D-mannose.  She called in 2022 c/o  UTI symptoms but her culture was negative. She was treated with Macrobid even though we had her on prophylactic nitrofurantoin.  The patient's age today 2023 is 82 year old. Please note interval events and changes in PMH, PSH, MEDS and ALLERGIES were reviewed. She stopped having UTI's because she stopped having intercourse. She is still using Estrace cream and her breast cancer doctor is aware. She is using it TIW but may go to [facility]. She stopped the D-mannose, cranberry, and Florafemme.  ===============================================================================  PMH: UTIs, Breast cancer, Hip replacement, Vaginal issues, Diverticulitis PSH: Hip replacement, Hysterectomy POBH: (if applicable) FH: Daughter had breast cancer, Son  of sarcoma First  (father of kids) has prostate cancer ALL:NKDA MEDS: Synthroid, Buckthorn oil, Vit D3, Align, Vaginal estradiol SOC: Denies Tob, EtOH, drugs   ROS: Review of Systems is as per HPI unless otherwise denoted below   =============================================================================== DATA:   LABS:------------------------------------------------------------------------------------------------------------------- 2021: Ucx >100K R: Amp 2021: UCx Klebsiella R: Amp   [facility] testing for genetic abnormalities was normal 2022: [facility] Multi cancer panel negative  2022: Negative Udip  2020: UA pos NIT, ++LE, Neg GLC, BLD, Ketones, PRT, 0 RBC, >60 WBC; UCx E. coli S: Cipro, Fosfo, LVQ, Nit, Pivmecillinam, Bactrim 2020: UA negative  2022: Received Urine culture from [facility] 2022: UCx 50-100K E. coli: R: Amp, Unasyn, Ancef, CTX, Cipro, Gent, LVQ, Tobra Bactrim I: Augmentin S: Erta, Cefepime, Imi, Nit, Pip/Tazo  2022: UA trace, Neg NIT/LE, Glucose Neg, PRT 2023: UA dip trace blood, Small LE    RADS:-------------------------------------------------------------------------------------------------------------------  2/15/2022: Sonogram done Right kidney and left kidney have no masses.  Few tiny stones in RUP between 2-3mm. BL renal cyst up to 1.2cm on right and 2.6cm on left. No collections Small splenic hematoma. Bladder normal No sig PVR. BL jets seen.  2/15/2022: Bone density:  Osteoporosis based on Left femoral neck T score. The BMD for the left femoral neck decreased by -6.7% since the last DXA scan  2023: RBUS  bilateral renal cysts; 5mm RUP echogenic area stone vs artifact Bladder normal with BL jets. PVR 31cc   PATHOLOGY/CYTOLOGY:-------------------------------------------------------------------------------------------    VOIDING STUDIES: ---------------------------------------------------------------------------------------------------- 2021: PVR 30cc   STONE STUDIES: (Analysis/LLSA)---------------------------------------------------------------------------------- : LLSA POOR: Volumes 0.97/0.70 and Citrates 143, pH 5.3 leading to poor SS UA GOOD: Oxalates, Calcium, Elytes   PROCEDURES: -----------------------------------------------------------------------------------------------     ===============================================================================  PHYSICAL EXAM:  GEN: AAOx3, NAD, Habitus: normal  BARRIERS to CARE: none  PSYCH: Appropriate Behavior, Affect Congruent  HEENT: AT/NC Trachea midline. EOMI.  Lungs: No labored breathing.  NEURO: + Movement, all 4 extremities grossly intact without deficits. No tremors.  SKIN: Warm dry. No visible rashes or ulcers  GAIT: Gait normal, Stability good  ======================================================================================= ASSESSMENT and PLAN  The patient is a 81 year female with a history of the followin. Recurrent UTI's She is abstaining from penetrative intercourse and no longer has UTI's  2. Given her family history of cancer I offered her germline screening She accepted this. Her son has a h/o sarcoma and her daughter breast CA. Her panel was negative and results were released.  3. Renal stones: We will repeat a sonogram in 2024  4. r/o microhematuria Send UA micro ----------------------------------------------------------------------------------------------------- LABS/TESTS Ordered: renal sono 2024; UA micro Meds Ordered: Follow up: After the sono -----------------------------------------------------------------------------------------------------  The total amount of time I have personally spent preparing for this visit, reviewing the patient's test results, obtaining external history, ordering tests/medications, documenting clinical information, communicating with and counseling the patient/family and/or caregiver(s), and spent face to face with the patient explaining the above was  25 minutes.   Thank you for allowing me to assist in the care of your patient. Should you have any questions please do not hesitate to reach out to me.   Alise Godinez MD Associate  Department of Urology [facility] Phone: [phone] Fax: [phone] [address] [address]

## 2024-04-22 ENCOUNTER — APPOINTMENT (OUTPATIENT)
Dept: UROLOGY | Facility: CLINIC | Age: 84
End: 2024-04-22
Payer: MEDICARE

## 2024-04-22 PROCEDURE — 99213 OFFICE O/P EST LOW 20 MIN: CPT

## 2024-04-23 LAB
APPEARANCE: ABNORMAL
BACTERIA: NEGATIVE /HPF
BILIRUB UR QL STRIP: NORMAL
BILIRUBIN URINE: NEGATIVE
BLOOD URINE: ABNORMAL
CAST: 1 /LPF
CLARITY UR: CLEAR
COLLECTION METHOD: NORMAL
COLOR: YELLOW
EPITHELIAL CELLS: 14 /HPF
GLUCOSE QUALITATIVE U: NEGATIVE MG/DL
GLUCOSE UR-MCNC: NORMAL
HCG UR QL: 0.2 EU/DL
HGB UR QL STRIP.AUTO: NORMAL
KETONES UR-MCNC: NORMAL
KETONES URINE: NEGATIVE MG/DL
LEUKOCYTE ESTERASE UR QL STRIP: NORMAL
LEUKOCYTE ESTERASE URINE: ABNORMAL
MICROSCOPIC-UA: NORMAL
NITRITE UR QL STRIP: NORMAL
NITRITE URINE: NEGATIVE
PH UR STRIP: 5
PH URINE: 5.5
PROT UR STRIP-MCNC: NORMAL
PROTEIN URINE: 30 MG/DL
RED BLOOD CELLS URINE: 0 /HPF
REVIEW: NORMAL
SP GR UR STRIP: 1.03
SPECIFIC GRAVITY URINE: 1.03
UROBILINOGEN URINE: 0.2 MG/DL
WHITE BLOOD CELLS URINE: 66 /HPF

## 2024-04-23 NOTE — ADDENDUM
[FreeTextEntry1] : 4/22/2024: UA micro 30 Protein, Trace non-hemolyzed, Mod LE, 66 WBC, 0 RBC, 14 Epi

## 2024-04-23 NOTE — HISTORY OF PRESENT ILLNESS
[FreeTextEntry1] : Language: English Date of First visit: 2021 Accompanied by: Self Contact info: [phone]  Referring Provider/PCP: Dr. Leanne Elliott [place] [phone]    CC/ Problem List: UTIs   =============================================================================== FIRST VISIT: The patient was a 81 year female who first presented on 2022 for recurrent UTI's. She was in a "kidney hospital' as a child and she had urinary frequency. She never got toilet trained after age 4yo. She also had nocturnal enuresis.   She thinks her first UTI was around age 24yo. her UTI's were related to intercourse. She also feels like now, she gets UTI's with intercourse. She denies constipation. She takes showers not baths. She does not swim, ride a bike or spin. She uses a lubricant for intercourse. She was on hormones but was taken off of them when she got breast cancer. She was not on long term treatment for her breast cancer. She denies yeast infections. She does have vaginal dryness. She is on Estrace cream for her vaginal dryness. She feels it works and can use it with her breast cancer. She does drink a lot of water.   ------------------------------------------------------------------------------------------- INTERVAL VISITS:  22 pages records reviewed: She was put on Bactrim on 2021. She was put on Macrobid on 2021. She was given 8 weeks suppressive Nitrofurantoin 50mg po QD on 2020. Pt went to [facility] on 2022 with c/o UTI symptoms. She was given Nitrofurantoin.  She gets most of her UTI's with intercourse. She has used vaginal estradiol since 2022. She is also using cranberry, Florafemme and D-mannose.   She called in 2022 c/o  UTI symptoms but her culture was negative. She was treated with Macrobid even though we had her on prophylactic nitrofurantoin. At her visit in 2023, she had stopped having UTI's because she stopped having intercourse. She was using Estrace cream and her breast cancer doctor is aware. She is using it TIW but may go to [facility]. She stopped the D-mannose, cranberry, and Florafemme.  The patient's age today 2024  is 83 year old. Please note interval events and changes in PMH, PSH, MEDS and ALLERGIES were reviewed. she is doing well. She is making a Kugel for passover.  She denies problems voiding, severe back pain, and gross hematuria. She has not had a UTI. She stopped having intercourse (penetrative). She also meditates. Her  has amyloid. She was put on a statin and lexapro but may stop.  ===============================================================================  PMH: UTIs, Breast cancer, Hip replacement, Vaginal issues, Diverticulitis PSH: Hip replacement, Hysterectomy POBH: (if applicable) FH: Daughter had breast cancer, Son  of sarcoma First  (father of kids) has prostate cancer ALL: NKDA MEDS: Synthroid, Buckthorn oil, Vit D3, Align, Vaginal estradiol, Statin, Lexapro SOC: Denies Tob, EtOH, drugs   ROS: Review of Systems is as per HPI unless otherwise denoted below   =============================================================================== DATA:   LABS:------------------------------------------------------------------------------------------------------------------- 2021: Ucx >100K R: Amp 2021: UCx Klebsiella R: Amp   Inv[facility]ae testing for genetic abnormalities was normal 2022: [facility] Multi cancer panel negative  2022: Negative Udip  2020: UA pos NIT, ++LE, Neg GLC, BLD, Ketones, PRT, 0 RBC, >60 WBC; UCx E. coli S: Cipro, Fosfo, LVQ, Nit, Pivmecillinam, Bactrim 2020: UA negative  2022: Received Urine culture from [facility] 2022: UCx 50-100K E. coli: R: Amp, Unasyn, Ancef, CTX, Cipro, Gent, LVQ, Tobra Bactrim I: Augmentin S: Erta, Cefepime, Imi, Nit, Pip/Tazo  2022: UA trace, Neg NIT/LE, Glucose Neg, PRT 2023: UA dip trace blood, Small LE 2023: UA micro 7 WBC, 0 RBC, 8 Epi, Mod LE (likely contaminated) 2024: UA dip Trace blood, Trace protein, Large LE   RADS:-------------------------------------------------------------------------------------------------------------------  2/15/2022: Sonogram done Right kidney and left kidney have no masses.  Few tiny stones in RUP between 2-3mm. BL renal cyst up to 1.2cm on right and 2.6cm on left. No collections Small splenic hematoma. Bladder normal No sig PVR. BL jets seen.  2/15/2022: Bone density:  Osteoporosis based on Left femoral neck T score. The BMD for the left femoral neck decreased by -6.7% since the last DXA scan  2023: RBUS  bilateral renal cysts; 5mm RUP echogenic area stone vs artifact Bladder normal with BL jets. PVR 31cc  3/25/2024: Bladder sono normal with PVR 12cc, BL jets seen, no abnormalities renal sono: Kidneys normal with RUP 2.2cm cyst and 1.2cm RUP cyst and 1.9cm RMP cyst Small hyperechoic lesion in RUP which could be a stone; stable from prior No hydro Left kidney with 3cm partially exophytic mid pole cyst 1.9cm LUP cyst; no Ca++, no hydro   PATHOLOGY/CYTOLOGY:-------------------------------------------------------------------------------------------    VOIDING STUDIES: ---------------------------------------------------------------------------------------------------- 2021: PVR 30cc   STONE STUDIES: (Analysis/LLSA)---------------------------------------------------------------------------------- : LLSA POOR: Volumes 0.97/0.70 and Citrates 143, pH 5.3 leading to poor SS UA GOOD: Oxalates, Calcium, Elytes   PROCEDURES: -----------------------------------------------------------------------------------------------     ===============================================================================  PHYSICAL EXAM:  GEN: AAOx3, NAD, Habitus: normal  BARRIERS to CARE: none  PSYCH: Appropriate Behavior, Affect Congruent  HEENT: AT/NC Trachea midline. EOMI.  Lungs: No labored breathing.  NEURO: + Movement, all 4 extremities grossly intact without deficits. No tremors.  SKIN: Warm dry. No visible rashes or ulcers  GAIT: Gait normal, Stability good  ======================================================================================= ASSESSMENT and PLAN  The patient is a 83 year female with a history of the followin. Recurrent UTI's She is abstaining from penetrative intercourse and no longer has UTI's  2. Given her family history of cancer I offered her germline screening Her screening was negative  3. Renal stones: Her sono showed a stable hyperechoic lesion. We will monitor this.  4. r/o microhematuria Send UA micro  ----------------------------------------------------------------------------------------------------- LABS/TESTS Ordered: renal sono 2025; UA micro Meds Ordered: Follow up: After the sono -----------------------------------------------------------------------------------------------------  The total amount of time I have personally spent preparing for this visit, reviewing the patient's test results, obtaining external history, ordering tests/medications, documenting clinical information, communicating with and counseling the patient/family and/or caregiver(s), and spent face to face with the patient explaining the above was 25 minutes.  Thank you for allowing me to assist in the care of your patient. Should you have any questions please do not hesitate to reach out to me.   Alise Godinez MD Associate  Department of Urology [facility] Phone: [phone] Fax: [phone] [address] [address]

## 2024-05-09 NOTE — ED PROVIDER NOTE - CARE PLAN
Include Z78.9 (Other Specified Conditions Influencing Health Status) As An Associated Diagnosis?: Yes Medical Necessity Clause: This procedure was medically necessary because the lesions that were treated were: Spray Paint Technique: No Aperture Size (Optional): C Post-Care Instructions: I reviewed with the patient in detail post-care instructions. Patient is to wear sunprotection, and avoid picking at any of the treated lesions. Pt may apply Vaseline to crusted or scabbing areas. Number Of Freeze-Thaw Cycles: 3 freeze-thaw cycles Application Tool (Optional): Cry-AC Detail Level: Detailed Spray Paint Text: The liquid nitrogen was applied to the skin utilizing a spray paint frosting technique. Consent: The patient's consent was obtained including but not limited to risks of crusting, scabbing, blistering, scarring, darker or lighter pigmentary change, recurrence, incomplete removal and infection. Medical Necessity Information: It is in your best interest to select a reason for this procedure from the list below. All of these items fulfill various CMS LCD requirements except the new and changing color options. Duration Of Freeze Thaw-Cycle (Seconds): 2 Application Tool (Optional): Liquid Nitrogen Sprayer Duration Of Freeze Thaw-Cycle (Seconds): 3 Principal Discharge DX:	JENISE (acute kidney injury)  Secondary Diagnosis:	Transient global amnesia

## 2025-04-21 ENCOUNTER — APPOINTMENT (OUTPATIENT)
Dept: UROLOGY | Facility: CLINIC | Age: 85
End: 2025-04-21

## 2025-05-19 ENCOUNTER — APPOINTMENT (OUTPATIENT)
Dept: UROLOGY | Facility: CLINIC | Age: 85
End: 2025-05-19
Payer: MEDICARE

## 2025-05-19 ENCOUNTER — NON-APPOINTMENT (OUTPATIENT)
Age: 85
End: 2025-05-19

## 2025-05-19 VITALS — HEART RATE: 63 BPM | SYSTOLIC BLOOD PRESSURE: 131 MMHG | OXYGEN SATURATION: 96 % | DIASTOLIC BLOOD PRESSURE: 79 MMHG

## 2025-05-19 PROCEDURE — 99213 OFFICE O/P EST LOW 20 MIN: CPT

## 2025-05-19 PROCEDURE — 76775 US EXAM ABDO BACK WALL LIM: CPT
